# Patient Record
(demographics unavailable — no encounter records)

---

## 2025-01-05 NOTE — PHYSICAL EXAM
[Chaperone Present] : A chaperone was present in the examining room during all aspects of the physical examination [[id number]] : A chaperone was present during the pelvic exam. [Appropriately responsive] : appropriately responsive [Alert] : alert [No Acute Distress] : no acute distress [Oriented x3] : oriented x3 [Examination Of The Breasts] : a normal appearance [No Masses] : no breast masses were palpable [Labia Majora] : normal [Labia Minora] : normal [Normal] : normal [FreeTextEntry6] : Normal sized retroverted uterus with some tenderness to palpation. Normal adnexa bilaterally.

## 2025-01-05 NOTE — DISCUSSION/SUMMARY
[FreeTextEntry1] : 27 y/o  LMP  with pelvic pain and tenderness on exam, has normal ultrasounds and MRI. Menses themselves not very painful, symptoms are separate from menses and more with vaginal insertions. Possible that patient has endometriosis, but tolerates oral hormonal therapies poorly. Currently reports symptoms feel improved spontaneously but also is not currently sexually active. Pt presenting for annual visit today. - Exam normal today - Pap NIL 2023, due  - Discussed contraception, pt declining. - Can continue to monitor pain symptoms, if they worsen and she desires treatment, can return at that time, otherwise can return for annual visit or as needed.  Shravan Christian MD

## 2025-01-05 NOTE — PHYSICAL EXAM
[Chaperone Present] : A chaperone was present in the examining room during all aspects of the physical examination [[id number]] : A chaperone was present during the pelvic exam. [Appropriately responsive] : appropriately responsive [Alert] : alert [No Acute Distress] : no acute distress [Oriented x3] : oriented x3 [Examination Of The Breasts] : a normal appearance [No Masses] : no breast masses were palpable [Labia Majora] : normal [Labia Minora] : normal [Normal] : normal [FreeTextEntry6] : Normal sized retroverted uterus with some tenderness to palpation. Normal adnexa bilaterally.

## 2025-01-05 NOTE — PHYSICAL EXAM
[Chaperone Present] : A chaperone was present in the examining room during all aspects of the physical examination [[id number]] : A chaperone was present during the pelvic exam. [Appropriately responsive] : appropriately responsive [Alert] : alert [Oriented x3] : oriented x3 [No Acute Distress] : no acute distress [Examination Of The Breasts] : a normal appearance [No Masses] : no breast masses were palpable [Labia Majora] : normal [Labia Minora] : normal [Normal] : normal [FreeTextEntry6] : Normal sized retroverted uterus with some tenderness to palpation. Normal adnexa bilaterally.

## 2025-01-05 NOTE — HISTORY OF PRESENT ILLNESS
[N] : Patient does not use contraception [Y] : Positive pregnancy history [Menarche Age: ____] : age at menarche was [unfilled] [Currently Active] : currently active [Men] : men [No] : No [PapSmeardate] : 8/21/2023 [TextBox_31] : neg [GonorrheaDate] : 8/21/2023 [TextBox_63] : neg [ChlamydiaDate] : 8/21/2023 [TextBox_68] : neg [LMPDate] : 12/13/2024 [[facility]] : 1 [PGHxTotal] : 2 [[facility]] : 1 [PGHxABSpont] : 1 [FreeTextEntry1] : 12/13/2024

## 2025-01-05 NOTE — HISTORY OF PRESENT ILLNESS
[N] : Patient does not use contraception [Y] : Positive pregnancy history [Menarche Age: ____] : age at menarche was [unfilled] [Currently Active] : currently active [Men] : men [No] : No [PapSmeardate] : 8/21/2023 [TextBox_31] : neg [GonorrheaDate] : 8/21/2023 [ChlamydiaDate] : 8/21/2023 [TextBox_63] : neg [TextBox_68] : neg [LMPDate] : 12/13/2024 [PGHxTotal] : 2 [[facility]xFulerm] : 1 [[facility] Servicesiving] : 1 [PGHxABSpont] : 1 [FreeTextEntry1] : 12/13/2024

## 2025-01-05 NOTE — DISCUSSION/SUMMARY
[FreeTextEntry1] : 29 y/o  LMP  with pelvic pain and tenderness on exam, has normal ultrasounds and MRI. Menses themselves not very painful, symptoms are separate from menses and more with vaginal insertions. Possible that patient has endometriosis, but tolerates oral hormonal therapies poorly. Currently reports symptoms feel improved spontaneously but also is not currently sexually active. Pt presenting for annual visit today. - Exam normal today - Pap NIL 2023, due  - Discussed contraception, pt declining. - Can continue to monitor pain symptoms, if they worsen and she desires treatment, can return at that time, otherwise can return for annual visit or as needed.  Shravan Christian MD

## 2025-01-05 NOTE — HISTORY OF PRESENT ILLNESS
[N] : Patient does not use contraception [Y] : Positive pregnancy history [Menarche Age: ____] : age at menarche was [unfilled] [Currently Active] : currently active [Men] : men [No] : No [PapSmeardate] : 8/21/2023 [TextBox_31] : neg [GonorrheaDate] : 8/21/2023 [TextBox_63] : neg [ChlamydiaDate] : 8/21/2023 [TextBox_68] : neg [LMPDate] : 12/13/2024 [PGHxTotal] : 2 [[facility]xF[facility]] : 1 [[facility]iving] : 1 [PGHxABSpont] : 1 [FreeTextEntry1] : 12/13/2024

## 2025-01-16 NOTE — HISTORY OF PRESENT ILLNESS
----- Message from Michelle Fine sent at 10/19/2018  9:43 AM CDT -----  Contact: Pt   Pt called to speak with nurse deepthi have a few questions about appts that was cancel   Callback#[phone]  Thank You  MONI Fine    Appointment made.    
[FreeTextEntry1] : 27 y/o  LMP  presenting for annual visit. Patient has been followed for pelvic pain and possible endometriosis. Patient has not been on any hormonal contraception, has made some diet changes since last visit and is following with GI and feels pain may be improved, but she has also not recently been sexually active which is where pain is more noticeable. At last visit she was considering scheduling laparoscopy for possible endometriosis but given recent improvements she is choosing to not go that route for now and continue to monitor.  Otherwise pt has no new complaints, no new medical diagnoses  OBHx:  x1, 2018  Details of pain symptoms from 24 note: Pain reports she has been having pelvic pain for a long time now but for the past year or two it has become more severe. Most bothersome pain is deep in her pelvis on the right side and hurts both when she puts a tampon in, with transvaginal ultrasounds, and with certain sex positions, but not consistently. Pain got worse when she got appendicitis and improved after LSC appendectomy in 2023, but she believes improvement may be more related to NSAID use at that time. She has tried to time intercourse and use ibuprofen prior to intercourse and this has helped some but it is difficult to do consistently. Menses themselves used to be heavy prior to her delivery, but since then they have been 5 days with mild cramps the first 2-3 days. She will occasionally be a few weeks late, has been diagnosed with PCOS before (notices some acne and unwanted facial hairs).  She has tried many combined OCPs in the past and has had side effects including breast tenderness and swelling as well has HTN. Most recently she tried Lo Loestrin for a month in the fall but had the same effects.  Pt reports mentioning possible endometriosis to surgeon prior to appendectomy and patient reports she was told post-op there were no obvious adhesions in the right pelvis but it similarly was not explored thoroughly. Pathology results from appendectomy consistent with appendicitis, no other findings.  Patient also has recent possible IBS diagnosis, has made diet changes for this which has helped with some bloating symptoms she has had but has not helped with the pain. Has been recommended pelvic floor PT in the past but her insurance does not cover this.   Preventative Visit:   PAP Smear: neg, 2023.   Gonorrhea: neg, 2023.   Chlamydia: neg, 2023.   LMP: 2024   Contraception: Patient does not use contraception.   Sexually Activity: Patient is sexually active.   Pregnancy History: Positive pregnancy history.   Total Pre   Full Term: 1   Livin   AB Spont: 1   Menstrual history:   LMP: 2024.   Menses: age at menarche was 14.    Sexual History: Patient does not have concerns regarding sex.   Sexual Activity: currently active   Sexual Partners: men   Use of protection from pregnancy and STDs: No

## 2025-01-16 NOTE — CHIEF COMPLAINT
[FreeTextEntry1] : [place] Office  [facility] [phone] at [place] [phone]

## 2025-02-20 NOTE — HISTORY OF PRESENT ILLNESS
[FreeTextEntry1] : Pt is a 29 yo with likely endometriosis and adenomyosis, here today for in office IUD insertion.   She was last seen in office on 1/16/25, for evaluation of endometriosis. Exam tenderness noted over right ovary, questionably consistent with hemorrhagic cyst. Large boggy uterus c/w adenomyosis. We discussed based on her exam and prior normal imaging it is likely she would have a stage 1-2 diagnosis of endometriosis, so I don't believe surgical intervention would have much symptomatic relief unless there are visible implants. We discussed surgical management vs medical management, I do not recommend surgical management because I do not think that will change her pain and overall quality of life. Pt had poor experiences with OCP and liver hemangioma in the past, discussed she can consider Mirena IUD to help with ovarian suppression.  Health Screening: Last Pap: 8/2023; normal upon chart review

## 2025-02-20 NOTE — ASSESSMENT
[FreeTextEntry1] : Pt is a 29 yo with likely endometriosis and adenomyosis, here today for in office IUD insertion. Attempted placement of IUD mirena not successful due to endocervical stenosis. Discussed placement under anesthesia vs placement in office with medication to soften/dilate cervix but not guaranteed to succeed. The patient will think about it and let us know her decision.

## 2025-02-20 NOTE — ASSESSMENT
[FreeTextEntry1] : Pt is a 27 yo with likely endometriosis and adenomyosis, here today for in office IUD insertion. Attempted placement of IUD mirena not successful due to endocervical stenosis. Discussed placement under anesthesia vs placement in office with medication to soften/dilate cervix but not guaranteed to succeed. The patient will think about it and let us know her decision.

## 2025-02-20 NOTE — PROCEDURE
[Other ___] : [unfilled] [Other: ___] : [unfilled] [Patient] : the patient [Verbal Consent] : verbal consent was obtained prior to the procedure and is detailed in the patient's record [Betadine] : betadine [No] : the specimen was not sent to pathology [No Complications] : none [Tolerated Well] : the patient tolerated the procedure well [FreeTextEntry1] : Procedure:. intrauterine device (IUD) placement.  Pre-procedure time out performed. Patient's name, date of birth and procedure confirmed. Verbal consent obtained.  Indication: Endometriosis Risks, benefits and alternatives were discussed with the patient. We discussed possible complications, including infection, bleeding, pain, expulsion, failure and uterine perforation. Patient History and Testing: negative pregnancy test, negative GC/Chlamydia (08/2023) Stabilization: Allis clamp Cervix/Uterus: Easy passage and sounded to  IUD: Mirena IUD. Lot number: Patient Status: Patient tolerated the procedure well. Complications: No complications.

## 2025-02-20 NOTE — PHYSICAL EXAM
[Chaperone Present] : A chaperone was present in the examining room during all aspects of the physical examination [[id number]] : A chaperone was present during the pelvic exam. [FreeTextEntry2] : Nat Tirado MA was present the entire duration of the patient interaction and gynecological exam. [Normal] : Anus and perineum: Normal sphincter tone, no masses, no prolapse. [de-identified] : soft, non-tender [de-identified] : + nabothian cyst on posterior cervical lip [de-identified] : retroflexed [Fully active, able to carry on all pre-disease performance without restriction] : Status 0 - Fully active, able to carry on all pre-disease performance without restriction

## 2025-02-20 NOTE — END OF VISIT
[FreeTextEntry3] : Follow up with repeated IUD under anesthesia vs repeat attempt in office with cervix ripening [FreeTextEntry2] : Nat Tirado MA was present the entire duration of the patient interaction and gynecological exam.

## 2025-02-20 NOTE — PHYSICAL EXAM
[Chaperone Present] : A chaperone was present in the examining room during all aspects of the physical examination [[id number]] : A chaperone was present during the pelvic exam. [FreeTextEntry2] : Nat Tirado MA was present the entire duration of the patient interaction and gynecological exam. [Normal] : Anus and perineum: Normal sphincter tone, no masses, no prolapse. [de-identified] : soft, non-tender [de-identified] : + nabothian cyst on posterior cervical lip [de-identified] : retroflexed [Fully active, able to carry on all pre-disease performance without restriction] : Status 0 - Fully active, able to carry on all pre-disease performance without restriction